# Patient Record
Sex: MALE | HISPANIC OR LATINO | Employment: FULL TIME | URBAN - METROPOLITAN AREA
[De-identification: names, ages, dates, MRNs, and addresses within clinical notes are randomized per-mention and may not be internally consistent; named-entity substitution may affect disease eponyms.]

---

## 2023-03-11 ENCOUNTER — OFFICE VISIT (OUTPATIENT)
Dept: URGENT CARE | Facility: PHYSICIAN GROUP | Age: 18
End: 2023-03-11
Payer: COMMERCIAL

## 2023-03-11 VITALS
DIASTOLIC BLOOD PRESSURE: 72 MMHG | BODY MASS INDEX: 40.09 KG/M2 | HEIGHT: 70 IN | WEIGHT: 280 LBS | RESPIRATION RATE: 16 BRPM | TEMPERATURE: 97.2 F | SYSTOLIC BLOOD PRESSURE: 112 MMHG | HEART RATE: 75 BPM | OXYGEN SATURATION: 96 %

## 2023-03-11 DIAGNOSIS — M54.32 SCIATICA OF LEFT SIDE: ICD-10-CM

## 2023-03-11 PROCEDURE — 99203 OFFICE O/P NEW LOW 30 MIN: CPT | Performed by: PHYSICIAN ASSISTANT

## 2023-03-11 RX ORDER — IBUPROFEN 800 MG/1
800 TABLET ORAL EVERY 8 HOURS PRN
Qty: 30 TABLET | Refills: 0 | Status: SHIPPED | OUTPATIENT
Start: 2023-03-11

## 2023-03-11 ASSESSMENT — ENCOUNTER SYMPTOMS
FEVER: 0
LEG PAIN: 1

## 2023-03-11 NOTE — PROGRESS NOTES
"  Subjective:   Tay Booker is a 17 y.o. male who presents today with   Chief Complaint   Patient presents with    Leg Pain     Left leg px that radiates from knee up to hip x 2 weeks      Leg Pain  This is a new problem. Episode onset: 2 weeks. The problem occurs intermittently. The problem has been unchanged. Pertinent negatives include no fever. He has tried ice and rest for the symptoms. The treatment provided mild relief.   No recent injury or trauma.  Patient states he has been seen by his primary care doctor for similar issues and ibuprofen was helping his symptoms.    PMH:  has no past medical history on file.  MEDS:   Current Outpatient Medications:     ibuprofen (MOTRIN) 800 MG Tab, Take 1 Tablet by mouth every 8 hours as needed for Moderate Pain., Disp: 30 Tablet, Rfl: 0  ALLERGIES: No Known Allergies  SURGHX: No past surgical history on file.  SOCHX:  reports that he has never smoked. He has never used smokeless tobacco.  FH: Reviewed with patient, not pertinent to this visit.     Review of Systems   Constitutional:  Negative for fever.   Cardiovascular:  Negative for leg swelling.   Musculoskeletal:         Left leg pain      Objective:   /72   Pulse 75   Temp 36.2 °C (97.2 °F) (Temporal)   Resp 16   Ht 1.778 m (5' 10\")   Wt (!) 127 kg (280 lb)   SpO2 96%   BMI 40.18 kg/m²   Physical Exam  Vitals and nursing note reviewed.   Constitutional:       General: He is not in acute distress.     Appearance: Normal appearance. He is well-developed. He is obese. He is not ill-appearing or toxic-appearing.   HENT:      Head: Normocephalic and atraumatic.      Right Ear: Hearing normal.      Left Ear: Hearing normal.   Cardiovascular:      Rate and Rhythm: Normal rate.   Pulmonary:      Effort: Pulmonary effort is normal.   Musculoskeletal:      Right lower leg: No edema.      Left lower leg: No edema.        Legs:       Comments: Patient has tenderness to palpation to the left hip through the " left thigh.  No bruising or swelling and no deformity appreciated.  No bony tenderness to palpation.  Patient does have shooting pain with movement of the left lower extremity with extension and flexion.  Neurovascular intact distally.  Normal gait.  No erythema or swelling to the calf appreciated.  No tenderness to palpation to the left calf.   Skin:     General: Skin is warm and dry.   Neurological:      Mental Status: He is alert.      Coordination: Coordination normal.   Psychiatric:         Mood and Affect: Mood normal.       Assessment/Plan:   Assessment    1. Sciatica of left side  - ibuprofen (MOTRIN) 800 MG Tab; Take 1 Tablet by mouth every 8 hours as needed for Moderate Pain.  Dispense: 30 Tablet; Refill: 0  - Referral to Physical Therapy  Symptoms and presentation are consistent with sciatica at this time would recommend he follow-up with physical therapy and he does have an appointment with his primary care in 2 weeks.  Would recommend using ibuprofen prescription strength and also using stretches and range of motion exercises at home as discussed.    Differential diagnosis, natural history, supportive care, and indications for immediate follow-up discussed.   Patient given instructions and understanding of medications and treatment.    If not improving in 3-5 days, F/U with PCP or return to  if symptoms worsen.    Patient agreeable to plan.      Please note that this dictation was created using voice recognition software. I have made every reasonable attempt to correct obvious errors, but I expect that there are errors of grammar and possibly content that I did not discover before finalizing the note.    Charles Guerrero PA-C

## 2023-03-11 NOTE — LETTER
March 11, 2023         Patient: Tay Booker   YOB: 2005   Date of Visit: 3/11/2023           To Whom it May Concern:    Tay Booker was seen in my clinic on 3/11/2023.  Please excuse his absence from work over the next couple of days.    If you have any questions or concerns, please don't hesitate to call.        Sincerely,           Charles Guerrero P.A.-C.  Electronically Signed

## 2024-10-04 ENCOUNTER — HOSPITAL ENCOUNTER (OUTPATIENT)
Dept: LAB | Facility: MEDICAL CENTER | Age: 19
End: 2024-10-04
Payer: COMMERCIAL

## 2024-10-04 LAB
CHOLEST SERPL-MCNC: 150 MG/DL (ref 100–199)
EST. AVERAGE GLUCOSE BLD GHB EST-MCNC: 108 MG/DL
HBA1C MFR BLD: 5.4 % (ref 4–5.6)
HDLC SERPL-MCNC: 40 MG/DL
LDLC SERPL CALC-MCNC: 93 MG/DL
TRIGL SERPL-MCNC: 83 MG/DL (ref 0–149)

## 2024-10-04 PROCEDURE — 36415 COLL VENOUS BLD VENIPUNCTURE: CPT

## 2024-10-04 PROCEDURE — 83036 HEMOGLOBIN GLYCOSYLATED A1C: CPT

## 2024-10-04 PROCEDURE — 80061 LIPID PANEL: CPT
